# Patient Record
Sex: FEMALE | URBAN - METROPOLITAN AREA
[De-identification: names, ages, dates, MRNs, and addresses within clinical notes are randomized per-mention and may not be internally consistent; named-entity substitution may affect disease eponyms.]

---

## 2022-02-03 ENCOUNTER — OFFICE VISIT (OUTPATIENT)
Dept: URBAN - METROPOLITAN AREA CLINIC 60 | Facility: CLINIC | Age: 42
End: 2022-02-03
Payer: MEDICAID

## 2022-02-03 DIAGNOSIS — H11.152 PINGUECULA, LEFT EYE: Primary | ICD-10-CM

## 2022-02-03 PROCEDURE — 92004 COMPRE OPH EXAM NEW PT 1/>: CPT | Performed by: OPTOMETRIST

## 2022-02-03 RX ORDER — KETOROLAC TROMETHAMINE 5 MG/ML
0.5 % SOLUTION OPHTHALMIC
Qty: 5 | Refills: 0 | Status: ACTIVE
Start: 2022-02-03

## 2022-02-03 RX ORDER — PROPYLENE GLYCOL 0.06 MG/ML
0.6 % SOLUTION/ DROPS OPHTHALMIC
Qty: 0 | Refills: 0 | Status: ACTIVE
Start: 2022-02-03

## 2022-02-03 ASSESSMENT — KERATOMETRY
OD: 41.52
OS: 41.44

## 2022-02-03 ASSESSMENT — INTRAOCULAR PRESSURE
OS: 15
OD: 14

## 2022-02-03 ASSESSMENT — VISUAL ACUITY
OD: 20/20
OS: 20/20

## 2022-02-03 NOTE — IMPRESSION/PLAN
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 Plan: Patient educated on findings. Lissamine green staining done today, negative OU. Recommend patient use artificial tears and use UV protection. Sample of Systane Complete given. Patient to be consistent with tears for at least one week to notice a difference in symptoms. Will also start patient on Ketorolac to use up to QID OU PRN, erx'd to patient's pharmacy.